# Patient Record
Sex: FEMALE | Race: WHITE | NOT HISPANIC OR LATINO | URBAN - METROPOLITAN AREA
[De-identification: names, ages, dates, MRNs, and addresses within clinical notes are randomized per-mention and may not be internally consistent; named-entity substitution may affect disease eponyms.]

---

## 2023-04-06 DIAGNOSIS — R92.8 ABNORMAL MAMMOGRAM: ICD-10-CM

## 2023-04-21 ENCOUNTER — TELEPHONE (OUTPATIENT)
Dept: PRIMARY CARE | Facility: CLINIC | Age: 63
End: 2023-04-21

## 2023-05-24 ENCOUNTER — TELEPHONE (OUTPATIENT)
Dept: PRIMARY CARE | Facility: CLINIC | Age: 63
End: 2023-05-24

## 2023-05-24 NOTE — TELEPHONE ENCOUNTER
Sorry I didn't write it clear enough.  Dr. Saldana wants a call back clarifying if this is true and get confirmation on some items on things in patient chart.  Patient isn't asking our office for meds.  Please call back Dr. Saldana.

## 2023-05-24 NOTE — TELEPHONE ENCOUNTER
Patient requesting antibiotics profalaxis for dental work because she gets infections after dental work and has to go to hospital.  She stated it is also because of her RA.  Dr. Saldana is requesting a call back to get confirmation please call her back.

## 2023-06-30 LAB
ALANINE AMINOTRANSFERASE (SGPT) (U/L) IN SER/PLAS: 23 U/L (ref 7–45)
ALBUMIN (G/DL) IN SER/PLAS: 4.5 G/DL (ref 3.4–5)
ALKALINE PHOSPHATASE (U/L) IN SER/PLAS: 69 U/L (ref 33–136)
ANION GAP IN SER/PLAS: 13 MMOL/L (ref 10–20)
ASPARTATE AMINOTRANSFERASE (SGOT) (U/L) IN SER/PLAS: 19 U/L (ref 9–39)
BASOPHILS (10*3/UL) IN BLOOD BY AUTOMATED COUNT: 0.03 X10E9/L (ref 0–0.1)
BASOPHILS/100 LEUKOCYTES IN BLOOD BY AUTOMATED COUNT: 0.6 % (ref 0–2)
BILIRUBIN TOTAL (MG/DL) IN SER/PLAS: 0.3 MG/DL (ref 0–1.2)
CALCIUM (MG/DL) IN SER/PLAS: 9.4 MG/DL (ref 8.6–10.6)
CARBON DIOXIDE, TOTAL (MMOL/L) IN SER/PLAS: 26 MMOL/L (ref 21–32)
CHLORIDE (MMOL/L) IN SER/PLAS: 104 MMOL/L (ref 98–107)
CREATININE (MG/DL) IN SER/PLAS: 0.75 MG/DL (ref 0.5–1.05)
EOSINOPHILS (10*3/UL) IN BLOOD BY AUTOMATED COUNT: 0.08 X10E9/L (ref 0–0.7)
EOSINOPHILS/100 LEUKOCYTES IN BLOOD BY AUTOMATED COUNT: 1.6 % (ref 0–6)
ERYTHROCYTE DISTRIBUTION WIDTH (RATIO) BY AUTOMATED COUNT: 12.9 % (ref 11.5–14.5)
ERYTHROCYTE MEAN CORPUSCULAR HEMOGLOBIN CONCENTRATION (G/DL) BY AUTOMATED: 32.1 G/DL (ref 32–36)
ERYTHROCYTE MEAN CORPUSCULAR VOLUME (FL) BY AUTOMATED COUNT: 97 FL (ref 80–100)
ERYTHROCYTES (10*6/UL) IN BLOOD BY AUTOMATED COUNT: 4.13 X10E12/L (ref 4–5.2)
GFR FEMALE: >90 ML/MIN/1.73M2
GLUCOSE (MG/DL) IN SER/PLAS: 85 MG/DL (ref 74–99)
HEMATOCRIT (%) IN BLOOD BY AUTOMATED COUNT: 40.2 % (ref 36–46)
HEMOGLOBIN (G/DL) IN BLOOD: 12.9 G/DL (ref 12–16)
IMMATURE GRANULOCYTES/100 LEUKOCYTES IN BLOOD BY AUTOMATED COUNT: 0.8 % (ref 0–0.9)
LEUKOCYTES (10*3/UL) IN BLOOD BY AUTOMATED COUNT: 5.1 X10E9/L (ref 4.4–11.3)
LYMPHOCYTES (10*3/UL) IN BLOOD BY AUTOMATED COUNT: 1.8 X10E9/L (ref 1.2–4.8)
LYMPHOCYTES/100 LEUKOCYTES IN BLOOD BY AUTOMATED COUNT: 35.2 % (ref 13–44)
MONOCYTES (10*3/UL) IN BLOOD BY AUTOMATED COUNT: 0.39 X10E9/L (ref 0.1–1)
MONOCYTES/100 LEUKOCYTES IN BLOOD BY AUTOMATED COUNT: 7.6 % (ref 2–10)
NEUTROPHILS (10*3/UL) IN BLOOD BY AUTOMATED COUNT: 2.77 X10E9/L (ref 1.2–7.7)
NEUTROPHILS/100 LEUKOCYTES IN BLOOD BY AUTOMATED COUNT: 54.2 % (ref 40–80)
NRBC (PER 100 WBCS) BY AUTOMATED COUNT: 0 /100 WBC (ref 0–0)
PLATELETS (10*3/UL) IN BLOOD AUTOMATED COUNT: 246 X10E9/L (ref 150–450)
POTASSIUM (MMOL/L) IN SER/PLAS: 4.3 MMOL/L (ref 3.5–5.3)
PROTEIN TOTAL: 7 G/DL (ref 6.4–8.2)
SODIUM (MMOL/L) IN SER/PLAS: 139 MMOL/L (ref 136–145)
UREA NITROGEN (MG/DL) IN SER/PLAS: 17 MG/DL (ref 6–23)

## 2024-01-29 DIAGNOSIS — M06.9 RHEUMATOID ARTHRITIS INVOLVING MULTIPLE SITES, UNSPECIFIED WHETHER RHEUMATOID FACTOR PRESENT (MULTI): Primary | ICD-10-CM

## 2024-01-29 RX ORDER — UPADACITINIB 15 MG/1
1 TABLET, EXTENDED RELEASE ORAL DAILY
COMMUNITY
Start: 2022-07-15 | End: 2024-01-29 | Stop reason: SDUPTHER

## 2024-01-29 RX ORDER — UPADACITINIB 15 MG/1
15 TABLET, EXTENDED RELEASE ORAL DAILY
Qty: 90 TABLET | Refills: 1 | Status: SHIPPED | OUTPATIENT
Start: 2024-01-29

## 2024-02-06 PROBLEM — M05.9 SEROPOSITIVE RHEUMATOID ARTHRITIS (MULTI): Status: ACTIVE | Noted: 2024-02-06

## 2024-02-06 PROBLEM — R76.8 CYCLIC CITRULLINATED PEPTIDE (CCP) ANTIBODY POSITIVE: Chronic | Status: ACTIVE | Noted: 2019-05-14

## 2024-02-06 NOTE — PROGRESS NOTES
Subjective   Patient ID: Devora Lawson is a 63 y.o. female who presents for No chief complaint on file..    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)    On Rinvoq   Last lab June 2023    Labs  cbc and cmp normal     Doing very well no pain joint swelling or stiffness of any degree  No side effects    PAIN: no  SWELLING: no  AM GEL: brief  SIDE EFFECTS OF MED: no     LAST LAB  Lab Results   Component Value Date     (H) 04/18/2022    SEDRATE 29 04/18/2022    CRP <0.10 01/20/2023    TSH 2.87 04/18/2022   Last lab November 2023 by [facility] normal CBC and CMP    PHYSICAL EXAM  NODES all stations negative 2+ equal  HEART  LUNGS  ABDOMEN soft without hepatosplenomegaly  VASCULAR 2+ equal  NEURO normal proximal distal muscle strength  SKIN no rash   JOINTS no active synovitis of any small or large joint of the upper or lower extremity  There is currently no information documented on the homunculus. Go to the Rheumatology activity and complete the homunculus joint exam.   Assessment/Plan   Diagnoses and all orders for this visit:  Cyclic citrullinated peptide (CCP) antibody positive  Seropositive rheumatoid arthritis (CMS/HCC)    Excellent response and suppression of rheumatoid arthritis utilizing written rinvoq.    No evidence of any active synovitis no side effects  Lab normal from November 2023 from [facility] results will be scanned    See back in 4 months with lab.

## 2024-02-07 ENCOUNTER — OFFICE VISIT (OUTPATIENT)
Dept: RHEUMATOLOGY | Facility: CLINIC | Age: 64
End: 2024-02-07
Payer: COMMERCIAL

## 2024-02-07 VITALS — SYSTOLIC BLOOD PRESSURE: 120 MMHG | HEART RATE: 65 BPM | DIASTOLIC BLOOD PRESSURE: 79 MMHG

## 2024-02-07 DIAGNOSIS — R76.8 CYCLIC CITRULLINATED PEPTIDE (CCP) ANTIBODY POSITIVE: Primary | Chronic | ICD-10-CM

## 2024-02-07 DIAGNOSIS — M05.9 SEROPOSITIVE RHEUMATOID ARTHRITIS (MULTI): ICD-10-CM

## 2024-02-07 PROCEDURE — 1036F TOBACCO NON-USER: CPT | Performed by: INTERNAL MEDICINE

## 2024-02-07 PROCEDURE — 99214 OFFICE O/P EST MOD 30 MIN: CPT | Performed by: INTERNAL MEDICINE

## 2024-02-07 RX ORDER — ZINC SULFATE 50(220)MG
CAPSULE ORAL
COMMUNITY

## 2024-02-07 RX ORDER — NORTRIPTYLINE HYDROCHLORIDE 10 MG/1
10 CAPSULE ORAL NIGHTLY
COMMUNITY

## 2024-02-07 RX ORDER — CHOLECALCIFEROL (VITAMIN D3) 25 MCG
TABLET ORAL
COMMUNITY

## 2024-02-07 RX ORDER — MAGNESIUM OXIDE/MAG AA CHELATE 300 MG
CAPSULE ORAL
COMMUNITY

## 2024-05-02 ENCOUNTER — OFFICE VISIT (OUTPATIENT)
Dept: DERMATOLOGY | Facility: CLINIC | Age: 64
End: 2024-05-02
Payer: COMMERCIAL

## 2024-05-02 DIAGNOSIS — L71.9 ROSACEA: Primary | ICD-10-CM

## 2024-05-02 DIAGNOSIS — L92.2 GRANULOMA FACIALE: ICD-10-CM

## 2024-05-02 DIAGNOSIS — L82.0 SEBORRHEIC KERATOSIS, INFLAMED: ICD-10-CM

## 2024-05-02 PROCEDURE — 11404 EXC TR-EXT B9+MARG 3.1-4 CM: CPT | Performed by: SPECIALIST

## 2024-05-02 PROCEDURE — 88305 TISSUE EXAM BY PATHOLOGIST: CPT | Performed by: DERMATOLOGY

## 2024-05-02 PROCEDURE — 99203 OFFICE O/P NEW LOW 30 MIN: CPT | Performed by: SPECIALIST

## 2024-05-02 NOTE — PROGRESS NOTES
Subjective   HPI: Devora Lawson is a 63 y.o. female is here for evaluation and treatment of spots on my body and arms and legs.  I am taking written David..     ROS: No other skin or systemic complaints other than what is documented elsewhere in the note.    ALLERGIES: Plaquenil [hydroxychloroquine]    SOCIAL:  reports that she has never smoked. She has never used smokeless tobacco. She reports current alcohol use. No history on file for drug use.    Objective     Description: Patient has erythema involving cheeks with 1 granulomatous circular area involving the left cheek.  Also noted are signs of seborrheic dermatitis involving her glabella and eyebrows.  The lesions involving her arms and legs are consistent with seborrheic keratoses.  These can be removed at a later date.  Continue giving 1    Assessment/Plan   1. Rosacea  Head - Anterior (Face)    Related Medications  ketoconazole-iodoquinoL-hc 2-1-2.5 % cream  Apply a thin layer to the affect area in the morning and at night    2. Granuloma faciale  Left Malar Cheek    Skin biopsy - Left Malar Cheek    Specimen 1 - Dermatopathology- DERM LAB  Differential Diagnosis: Granuloma faciale   Check Margins Yes/No?:    Comments:    Dermpath Lab: Routine Histopathology (formalin-fixed tissue)       Plan: SK N/V pharmaceuticals ketoconazole, iodoquinol hydrocortisone mix twice daily to face.  Shave biopsy for diagnosis only condylomatous oval area.  I discussed and reassured patient regarding her seborrheic keratoses and lentigo's.    FOLLOW UP: 1 week for biopsy results.    The patient was encouraged to contact me with any further questions or concerns.  Colin Berg MD  5/2/2024

## 2024-05-07 LAB
LABORATORY COMMENT REPORT: NORMAL
PATH REPORT.FINAL DX SPEC: NORMAL
PATH REPORT.GROSS SPEC: NORMAL
PATH REPORT.RELEVANT HX SPEC: NORMAL
PATH REPORT.TOTAL CANCER: NORMAL

## 2024-05-09 ENCOUNTER — APPOINTMENT (OUTPATIENT)
Dept: DERMATOLOGY | Facility: CLINIC | Age: 64
End: 2024-05-09
Payer: COMMERCIAL

## 2024-05-30 ENCOUNTER — OFFICE VISIT (OUTPATIENT)
Dept: DERMATOLOGY | Facility: CLINIC | Age: 64
End: 2024-05-30
Payer: COMMERCIAL

## 2024-05-30 DIAGNOSIS — L57.8 ACTINIC SKIN DAMAGE: ICD-10-CM

## 2024-05-30 PROCEDURE — 99213 OFFICE O/P EST LOW 20 MIN: CPT

## 2024-06-06 NOTE — PROGRESS NOTES
Subjective   Patient ID: Devora Lawsno is a 63 y.o. female who presents for No chief complaint on file..    HPI last seen 4 mos ago    TODAY VIRTUAL VIDEO APPT     GOOD TODAY  R ARM AND HAND NUMB   DR. 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)    On Rinvoq   Last lab June 2023    Labs  cbc and cmp normal     Doing very well no pain joint swelling or stiffness of any degree  No side effects    PAIN: no  SWELLING: no  AM GEL: brief  SIDE EFFECTS OF MED: no     LAST LAB  Lab Results   Component Value Date     (H) 04/18/2022    SEDRATE 29 04/18/2022    CRP <0.10 01/20/2023    TSH 2.87 04/18/2022   Last lab November 2023 by [facility] normal CBC and CMP    PHYSICAL EXAM  NODES all stations negative 2+ equal  HEART  LUNGS  ABDOMEN soft without hepatosplenomegaly  VASCULAR 2+ equal  NEURO normal proximal distal muscle strength  SKIN no rash   JOINTS no active synovitis of any small or large joint of the upper or lower extremity  There is currently no information documented on the homunculus. Go to the Rheumatology activity and complete the homunculus joint exam.   Assessment/Plan   There are no diagnoses linked to this encounter.    Excellent response and suppression of rheumatoid arthritis utilizing Rinvoq.    No evidence of any active synovitis no side effects  Lab THIS MO WILL MAIL HER REC TO GET AT [facility]     See back in 4 months with lab.

## 2024-06-06 NOTE — PROGRESS NOTES
Subjective   HPI: Devora Lawson is a 63 y.o. female who presents in office for biopsy results from 5/2/2024 Derm lab [id number] Left Malar Cheek.  Patient has questions about how to make this area heal better.    ROS: No other skin or systemic complaints other than what is documented elsewhere in the note.    ALLERGIES: Plaquenil [hydroxychloroquine]    SOCIAL:  reports that she has never smoked. She has never used smokeless tobacco. She reports current alcohol use. No history on file for drug use.    Objective   Head - Anterior (Face)  The area has healed nicely        Assessment/Plan   1. Actinic skin damage  Head - Anterior (Face)    Discussed biopsy results from 5/2/2024 Derm lab [id number] which showed nonspecific findings of actinic damage.  I discussed this with patient.  I also discussed that to better heal this area I recommended scar creams that contain silicone.  I recommended bio corneum applied twice daily as well as high SPF sunscreen SPF 50+ daily.         FOLLOW UP: As needed    The patient was encouraged to contact me with any further questions or concerns.  Doris Stewart PA-C  6/6/2024

## 2024-06-07 ENCOUNTER — TELEMEDICINE (OUTPATIENT)
Dept: RHEUMATOLOGY | Facility: CLINIC | Age: 64
End: 2024-06-07
Payer: COMMERCIAL

## 2024-06-07 DIAGNOSIS — M05.9 SEROPOSITIVE RHEUMATOID ARTHRITIS (MULTI): Primary | ICD-10-CM

## 2024-06-07 PROCEDURE — 99214 OFFICE O/P EST MOD 30 MIN: CPT | Performed by: INTERNAL MEDICINE

## 2024-06-07 NOTE — PROGRESS NOTES
Subjective   Patient ID: Devora Lawson is a 63 y.o. female who presents for No chief complaint on file..  [facility]    Review of Systems    Objective   Physical Exam    Assessment/Plan            Lorrie Burgos MA 06/07/24 1:57 PM Patient ID: Devora Lawson is a 63 y.o. female.    Procedures

## 2024-09-19 NOTE — PROGRESS NOTES
Patient presents today for new problem.  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  Offered MP joint arthrodesis.  She has not scheduled the surgery because she feels that her thumb is not painful and does not prevent daily activities.  Rheumatoid arthritis managed by Dr. Yip on [facility].  Presents today for a new problem.  Over the last year she has noticed a slowly progressively enlarging painless soft tissue mass over the volar ulnar aspect of her right forearm.  It is not painful except it is bothersome when she rests her forearm on a surface while keyboarding.    Past medical history, medications, allergies, surgical history and review of systems have been reviewed with the patient. Pertinent changes are documented in the HPI. Otherwise they are unchanged when compared to last visit on August 3, 2023.    Physical Examination Findings:  Constitutional: Appears well-developed and well-nourished.  Head: Normocephalic and atraumatic.  Eyes: Pupils are equal and round.  Cardiovascular: Intact distal pulses.   Respiratory: Effort normal. No respiratory distress.  Neurologic: Alert and oriented to person, place, and time.  Skin: Skin is warm and dry.  Hematologic / Lymphatic: No lymphedema, lymphangitis.  Psychiatric: normal mood and affect. Behavior is normal.   Musculoskeletal: Right upper extremity examination reveals a palpable and visible soft tissue mass measuring about 2 to 2-1/2 cm in diameter at the mid forearm on the volar ulnar aspect.  It is nontender and appears to be within the subcutaneous tissue plane.  No associated skin abnormalities.  Normal neurovascular sensory examination.  Mild flexion deformity right thumb MP joint.  No significant joint swelling.  Minimal tenderness to palpation at the MP joint.    X-rays of the right wrist extended up the forearm taken today demonstrate normal skeletal findings.    Impression: Right forearm  soft tissue mass.    Plan: Given its location and the physical examination findings this is most likely a lipoma or other benign condition.  Because it has increased in size over the last year patient has interest in removal.  We will get her scheduled for excisional biopsy of the soft tissue mass later this year.  She will discuss management of her rheumatoid medications with her rheumatologist at next visit.    We discussed risks, benefits, alternatives and anticipated post op course including time away from work and activities following surgical treatment for the patient's condition. This is major surgery with identifiable risks. No guarantees for success have been provided. The patient has expressed understanding and has elected to pursue operative treatment.        For Surgical Planning:  Diagnosis: Right forearm soft tissue mass  Procedure: Excision mass right forearm  CPT: 15527  Anesthesia: Local only  Duration: 30 minutes  Special Equipment Needed: None  Medical Notes / PM / DM / PAT needed?:  N/A  Location: [place] or [facility]  Initial Post Operative Visit: 2 weeks    Tha Navarro MD    [employer] [facility] [employer]  Department of Orthopaedic Surgery  Chief of Hand and Upper Extremity Surgery  [facility]    Dictation performed with the use of voice recognition software. Syntax and grammatical errors may exist.

## 2024-09-24 ENCOUNTER — OFFICE VISIT (OUTPATIENT)
Dept: ORTHOPEDIC SURGERY | Facility: HOSPITAL | Age: 64
End: 2024-09-24
Payer: COMMERCIAL

## 2024-09-24 ENCOUNTER — HOSPITAL ENCOUNTER (OUTPATIENT)
Dept: RADIOLOGY | Facility: HOSPITAL | Age: 64
Discharge: HOME | End: 2024-09-24
Payer: COMMERCIAL

## 2024-09-24 VITALS — WEIGHT: 158 LBS | BODY MASS INDEX: 25.39 KG/M2 | HEIGHT: 66 IN

## 2024-09-24 DIAGNOSIS — M05.9 SEROPOSITIVE RHEUMATOID ARTHRITIS: ICD-10-CM

## 2024-09-24 DIAGNOSIS — M79.89 MASS OF SOFT TISSUE OF FOREARM: Primary | ICD-10-CM

## 2024-09-24 DIAGNOSIS — M05.9 SEROPOSITIVE RHEUMATOID ARTHRITIS: Primary | ICD-10-CM

## 2024-09-24 PROCEDURE — 1036F TOBACCO NON-USER: CPT | Performed by: ORTHOPAEDIC SURGERY

## 2024-09-24 PROCEDURE — 73110 X-RAY EXAM OF WRIST: CPT | Mod: RT

## 2024-09-24 PROCEDURE — 99214 OFFICE O/P EST MOD 30 MIN: CPT | Performed by: ORTHOPAEDIC SURGERY

## 2024-09-24 PROCEDURE — 3008F BODY MASS INDEX DOCD: CPT | Performed by: ORTHOPAEDIC SURGERY

## 2024-09-24 PROCEDURE — 73110 X-RAY EXAM OF WRIST: CPT | Mod: RIGHT SIDE | Performed by: RADIOLOGY

## 2024-09-24 ASSESSMENT — PAIN - FUNCTIONAL ASSESSMENT: PAIN_FUNCTIONAL_ASSESSMENT: 0-10

## 2024-09-24 ASSESSMENT — PAIN DESCRIPTION - DESCRIPTORS: DESCRIPTORS: ACHING;SORE

## 2024-09-24 ASSESSMENT — PAIN SCALES - GENERAL: PAINLEVEL_OUTOF10: 5 - MODERATE PAIN

## 2024-09-24 NOTE — LETTER
September 24, 2024     Dana Andrade MD  [address] [address]    Patient: Devora Lawson   YOB: 1960   Date of Visit: 9/24/2024       Dear Dr. Dana Andrade MD:    Thank you for referring Devora Lawson to me for evaluation. Below are my notes for this consultation.  If you have questions, please do not hesitate to call me. I look forward to following your patient along with you.       Sincerely,     Tha Navarro MD      CC: Malcolm Yip, DO  ______________________________________________________________________________________    Patient presents today for new problem.  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  Offered MP joint arthrodesis.  She has not scheduled the surgery because she feels that her thumb is not painful and does not prevent daily activities.  [facility]  Presents today for a new problem.  Over the last year she has noticed a slowly progressively enlarging painless soft tissue mass over the volar ulnar aspect of her right forearm.  It is not painful except it is bothersome when she rests her forearm on a surface while keyboarding.    Past medical history, medications, allergies, surgical history and review of systems have been reviewed with the patient. Pertinent changes are documented in the HPI. Otherwise they are unchanged when compared to last visit on August 3, 2023.    Physical Examination Findings:  Constitutional: Appears well-developed and well-nourished.  Head: Normocephalic and atraumatic.  Eyes: Pupils are equal and round.  Cardiovascular: Intact distal pulses.   Respiratory: Effort normal. No respiratory distress.  Neurologic: Alert and oriented to person, place, and time.  Skin: Skin is warm and dry.  Hematologic / Lymphatic: No lymphedema, lymphangitis.  Psychiatric: normal mood and affect. Behavior is normal.    Musculoskeletal: Right upper extremity examination reveals a palpable and visible soft tissue mass measuring about 2 to 2-1/2 cm in diameter at the mid forearm on the volar ulnar aspect.  It is nontender and appears to be within the subcutaneous tissue plane.  No associated skin abnormalities.  Normal neurovascular sensory examination.  Mild flexion deformity right thumb MP joint.  No significant joint swelling.  Minimal tenderness to palpation at the MP joint.    X-rays of the right wrist extended up the forearm taken today demonstrate normal skeletal findings.    Impression: Right forearm soft tissue mass.    Plan: Given its location and the physical examination findings this is most likely a lipoma or other benign condition.  Because it has increased in size over the last year patient has interest in removal.  We will get her scheduled for excisional biopsy of the soft tissue mass later this year.  She will discuss management of her rheumatoid medications with her rheumatologist at next visit.    We discussed risks, benefits, alternatives and anticipated post op course including time away from work and activities following surgical treatment for the patient's condition. This is major surgery with identifiable risks. No guarantees for success have been provided. The patient has expressed understanding and has elected to pursue operative treatment.        For Surgical Planning:  Diagnosis: Right forearm soft tissue mass  Procedure: Excision mass right forearm  CPT: 71147  Anesthesia: Local only  Duration: 30 minutes  Special Equipment Needed: None  Medical Notes / PM / DM / PAT needed?:  N/A  Location: [place] or [facility]  Initial Post Operative Visit: 2 weeks    Tha Navarro MD    [employer] [employer]  Department of Orthopaedic Surgery  Chief of Hand and Upper Extremity Surgery  [facility]    Dictation performed with the  use of voice recognition software. Syntax and grammatical errors may exist.

## 2024-09-26 NOTE — PROGRESS NOTES
Subjective   Patient ID: Devora Lawson is a 63 y.o. female who presents for Follow-up (Patient states her RA is flaring and her shoulders are painful ).    HPI last seen June 2024    TODAY in person    Went to [place] for 3 weeks for shoulder pain  And FC elbows but can straighten  Rest body ok   Occ hand swelling    Labs lab tyson normal 8-2-24    GOOD TODAY  R ARM AND HAND NUMB   DR. 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)    On Rinvoq   Last lab June 2023    Labs  cbc and cmp normal     Doing very well no pain joint swelling or stiffness of any degree  No side effects    PAIN: y  SWELLING: no  AM GEL: brief  SIDE EFFECTS OF MED: no     LAST LAB  Lab Results   Component Value Date     (H) 04/18/2022    SEDRATE 29 04/18/2022    CRP <0.10 01/20/2023    TSH 2.87 04/18/2022   Last lab November 2024 by [facility] normal CBC and CMP    PHYSICAL EXAM  NODES all stations negative 2+ equal  HEART  LUNGS  ABDOMEN soft without hepatosplenomegaly  VASCULAR 2+ equal  NEURO normal proximal distal muscle strength  SKIN no rash   JOINTS no active synovitis of any small or large joint of the upper or lower extremity  There is currently no information documented on the homunculus. Go to the Rheumatology activity and complete the homunculus joint exam.   Assessment/Plan   Diagnoses and all orders for this visit:  Seropositive rheumatoid arthritis (Multi)  Cyclic citrullinated peptide (CCP) antibody positive  Rheumatoid arthritis involving multiple sites, unspecified whether rheumatoid factor present (Multi)  -     upadacitinib ER (Rinvoq) 15 mg tablet extended release 24 hr; Take 1 tablet (15 mg) by mouth once daily.    Sounds like a minor  flare of RA past 3 weeks  Exam no synovitis  She does not want prednisone  She will continue to use her Advil + with tylenol   Cont Rinvoq  Next lab include WSR and CRP         See back in 4 months with lab.

## 2024-09-27 ENCOUNTER — APPOINTMENT (OUTPATIENT)
Dept: RHEUMATOLOGY | Facility: CLINIC | Age: 64
End: 2024-09-27
Payer: COMMERCIAL

## 2024-09-27 VITALS — SYSTOLIC BLOOD PRESSURE: 127 MMHG | RESPIRATION RATE: 18 BRPM | HEART RATE: 58 BPM | DIASTOLIC BLOOD PRESSURE: 75 MMHG

## 2024-09-27 DIAGNOSIS — M06.9 RHEUMATOID ARTHRITIS INVOLVING MULTIPLE SITES, UNSPECIFIED WHETHER RHEUMATOID FACTOR PRESENT (MULTI): ICD-10-CM

## 2024-09-27 DIAGNOSIS — M05.9 SEROPOSITIVE RHEUMATOID ARTHRITIS: Primary | ICD-10-CM

## 2024-09-27 DIAGNOSIS — R76.8 CYCLIC CITRULLINATED PEPTIDE (CCP) ANTIBODY POSITIVE: Chronic | ICD-10-CM

## 2024-09-27 PROCEDURE — 99214 OFFICE O/P EST MOD 30 MIN: CPT | Performed by: INTERNAL MEDICINE

## 2024-10-31 ENCOUNTER — HOSPITAL ENCOUNTER (OUTPATIENT)
Dept: RADIOLOGY | Facility: CLINIC | Age: 64
Discharge: HOME | End: 2024-10-31
Payer: COMMERCIAL

## 2024-10-31 DIAGNOSIS — Z12.31 ENCOUNTER FOR SCREENING MAMMOGRAM FOR MALIGNANT NEOPLASM OF BREAST: ICD-10-CM

## 2024-10-31 PROCEDURE — 77067 SCR MAMMO BI INCL CAD: CPT

## 2024-11-06 DIAGNOSIS — M79.89 FOREARM SWELLING: ICD-10-CM

## 2024-11-15 ENCOUNTER — HOSPITAL ENCOUNTER (OUTPATIENT)
Facility: CLINIC | Age: 64
Setting detail: OUTPATIENT SURGERY
Discharge: HOME | End: 2024-11-15
Attending: ORTHOPAEDIC SURGERY | Admitting: ORTHOPAEDIC SURGERY
Payer: COMMERCIAL

## 2024-11-15 VITALS
RESPIRATION RATE: 16 BRPM | OXYGEN SATURATION: 99 % | DIASTOLIC BLOOD PRESSURE: 88 MMHG | HEART RATE: 69 BPM | SYSTOLIC BLOOD PRESSURE: 158 MMHG | WEIGHT: 161.38 LBS | BODY MASS INDEX: 25.94 KG/M2 | HEIGHT: 66 IN | TEMPERATURE: 97.7 F

## 2024-11-15 DIAGNOSIS — M79.89 FOREARM SWELLING: Primary | ICD-10-CM

## 2024-11-15 PROCEDURE — 7100000010 HC PHASE TWO TIME - EACH INCREMENTAL 1 MINUTE: Performed by: ORTHOPAEDIC SURGERY

## 2024-11-15 PROCEDURE — 2500000005 HC RX 250 GENERAL PHARMACY W/O HCPCS: Performed by: ORTHOPAEDIC SURGERY

## 2024-11-15 PROCEDURE — 2500000004 HC RX 250 GENERAL PHARMACY W/ HCPCS (ALT 636 FOR OP/ED): Performed by: ORTHOPAEDIC SURGERY

## 2024-11-15 PROCEDURE — 88304 TISSUE EXAM BY PATHOLOGIST: CPT | Mod: TC,SUR | Performed by: ORTHOPAEDIC SURGERY

## 2024-11-15 PROCEDURE — 3600000008 HC OR TIME - EACH INCREMENTAL 1 MINUTE - PROCEDURE LEVEL THREE: Performed by: ORTHOPAEDIC SURGERY

## 2024-11-15 PROCEDURE — 88304 TISSUE EXAM BY PATHOLOGIST: CPT | Performed by: STUDENT IN AN ORGANIZED HEALTH CARE EDUCATION/TRAINING PROGRAM

## 2024-11-15 PROCEDURE — 3600000003 HC OR TIME - INITIAL BASE CHARGE - PROCEDURE LEVEL THREE: Performed by: ORTHOPAEDIC SURGERY

## 2024-11-15 PROCEDURE — 25075 EXC FOREARM LES SC < 3 CM: CPT | Performed by: ORTHOPAEDIC SURGERY

## 2024-11-15 PROCEDURE — 7100000009 HC PHASE TWO TIME - INITIAL BASE CHARGE: Performed by: ORTHOPAEDIC SURGERY

## 2024-11-15 RX ORDER — HYDROCODONE BITARTRATE AND ACETAMINOPHEN 5; 325 MG/1; MG/1
1 TABLET ORAL EVERY 6 HOURS PRN
Qty: 20 TABLET | Refills: 0 | Status: SHIPPED | OUTPATIENT
Start: 2024-11-15 | End: 2024-11-20

## 2024-11-15 RX ORDER — SODIUM CHLORIDE 0.9 G/100ML
IRRIGANT IRRIGATION AS NEEDED
Status: DISCONTINUED | OUTPATIENT
Start: 2024-11-15 | End: 2024-11-15 | Stop reason: HOSPADM

## 2024-11-15 ASSESSMENT — PAIN SCALES - GENERAL
PAINLEVEL_OUTOF10: 0 - NO PAIN

## 2024-11-15 ASSESSMENT — COLUMBIA-SUICIDE SEVERITY RATING SCALE - C-SSRS
6. HAVE YOU EVER DONE ANYTHING, STARTED TO DO ANYTHING, OR PREPARED TO DO ANYTHING TO END YOUR LIFE?: NO
1. IN THE PAST MONTH, HAVE YOU WISHED YOU WERE DEAD OR WISHED YOU COULD GO TO SLEEP AND NOT WAKE UP?: NO
2. HAVE YOU ACTUALLY HAD ANY THOUGHTS OF KILLING YOURSELF?: NO

## 2024-11-15 ASSESSMENT — PAIN - FUNCTIONAL ASSESSMENT
PAIN_FUNCTIONAL_ASSESSMENT: 0-10

## 2024-11-15 NOTE — H&P
"History Of Present Illness  Devora Lawson is a 64 y.o. female presenting with symptomatic soft tissue mass volar aspect right distal forearm.     Past Medical History  Past Medical History:   Diagnosis Date    Encounter for gynecological examination (general) (routine) without abnormal findings     Pap test, as part of routine gynecological examination    Other conditions influencing health status     Mammogram normal    Personal history of other benign neoplasm 2014    History of uterine leiomyoma    Personal history of other specified conditions 2014    History of abnormal Pap smear       Surgical History  Past Surgical History:   Procedure Laterality Date     SECTION, CLASSIC  2014     Section    OTHER SURGICAL HISTORY  10/18/2021    Colonoscopy    OTHER SURGICAL HISTORY  2021    Breast augmentation        Social History  She reports that she has never smoked. She has never used smokeless tobacco. She reports current alcohol use. No history on file for drug use.    Family History  No family history on file.     Allergies  Plaquenil [hydroxychloroquine]    Review of Systems   All other systems reviewed and are negative.       Physical Exam  Physical Examination Findings:  Constitutional: Appears well-developed and well-nourished.  Head: Normocephalic and atraumatic.  Eyes: Pupils are equal and round.  Cardiovascular: Intact distal pulses.   Respiratory: Effort normal. No respiratory distress.  Neurologic: Alert and oriented to person, place, and time.  Skin: Skin is warm and dry.  Hematologic / Lympahtic: No lymphedema, lymphangitis.  Psychiatric: normal mood and affect. Behavior is normal.   Musculoskeletal: Right upper extremity with a nontender soft tissue mass volar ulnar aspect distal forearm      Last Recorded Vitals  Blood pressure 133/77, pulse 67, temperature 36.6 °C (97.9 °F), temperature source Temporal, resp. rate 16, height 1.676 m (5' 6\"), weight 73.2 kg " (161 lb 6 oz), SpO2 99%.       Assessment/Plan   Assessment & Plan  Forearm swelling      Will proceed with mass excision as scheduled       Tha Navarro MD

## 2024-11-15 NOTE — DISCHARGE INSTRUCTIONS
Post-Operative Instructions  Dr. Tha Navarro [phone]    Dressing:  You have a bandage or splint covering your operative site.    You may remove the dressing in 4  days following surgery. Once your dressing is removed you may shower and/or wash your incision in a sink with soap and water. Do not soak your incision. No bath tubs, hot tubs or swimming pools. After washing the wound please pat the incision dry thoroughly. Please use mild soap. Please do not use hydrogen peroxide. Please cover the wound with a band-aid or gauze and change it daily. Please do not apply any salves, creams, lotions or ointments to the surgical incision. Otherwise keep incision clean and dry (no yard work, engine work, etc.)    Post Anesthesia Instructions:  If you received general anesthesia or IV sedation for your procedure for the next 24 hours: No driving, no drinking alcohol, no sleeping aids, no important decision making, and have an adult with you for 24 hours.    Showering:  You may shower following surgery but please adhere to above instructions regarding the dressing. If showering with bandage/splint in place please ensure that it is kept dry and covered while bathing. There are commercially available cast bags that can be used to protect your dressing while showering. If using garbage bags please make sure that there are no holes in the bag and that the bag has been sealed above the dressing. If the bandage gets wet you must contact your surgeon's office to make arrangements to be seen to have the bandage changed.     Ice/Elevation:  The application of ice to your surgical site after surgery will help with pain control and swelling. This can be very effective for 48-72 hours after surgery. Please be careful to avoid getting bandage wet from a leaky ice bag. Please be advised that the ice may need to be applied for longer periods of time for the cooling effect to penetrate the post-operative dressing.     Elevation of the  operative site above the level of the heart as much as possible for the first 48-72 hours after surgery. Use your sling if you have been provided one while standing or walking. If your fingers are not included in the dressing you are encouraged to attempt finger range of motion as this will help with your hand swelling and ultimate recovery.    Pain Medication:  If you received a regional anesthetic on the day of your surgery your arm and hand may be numb for up to 24 hours after surgery. It is important to wear your sling while the block is still effective in order to protect your arm. It is advisable to take pain medications prior to going to sleep in case the regional anesthesia medication wears off while you are sleeping.    Take your pain medications as directed. Narcotic pain medications can cause lethargy, nausea and constipation. Please contact your surgeon's office and discontinue the medication if these symptoms become problematic. Eating a regular diet, drinking fluids and walking can help with constipation from these medications. Avoid alcohol consumption and driving while taking narcotic pain medications.     Additional pain control options:     You are encouraged to take over the counter medications like Advil / Motrin / Ibuprofen / Aleve in addition to your prescribed pain medications after surgery.    Smoking/Tobacco:  Tobacco use is known to interfere with wound and fracture healing and increase post-operative pain. It can also increase your risk of poor outcomes following surgery. Please do not use tobacco or nicotine products following your surgery. This includes smokeless tobacco, or nicotine replacement products (patches, gum, etc.).    Driving:  It is not advisable to operate a vehicle while using narcotic pain medications. Additionally, please be advised that you are likely to have challenges operating a car or motorcycle while you are still in your postoperative dressing and this could  increase your risk of being involved in an accident and being cited for driving while physically impaired.     Warning Signs:  Observe your arm/hand and incision site (if visible) for increased redness, inflammation, drainage, odor or pain that is unrelieved by rest, elevation or medication. Please contact your surgeon's office immediately if you develop any of these issues or if you develop a fever greater than 101°.    Follow Up Appointments:  Your post-operative appointment has been scheduled for 12/3/2024 at 10:15 am    [facility], [address], [place], [place], [address]

## 2024-11-15 NOTE — OP NOTE
Excision mass right forearm / 30 Minutes (R) Operative Note     Date: 11/15/2024  OR Location: [facility] OR    Name: Devora Lawson, : 1960, Age: 64 y.o., MRN: [id number], Sex: female    Diagnosis  Pre-op Diagnosis      * Forearm swelling [M79.89] Post-op Diagnosis     * Forearm swelling [M79.89]     Procedures  Excision mass right forearm / 30 Minutes  44933 - [place] EXC TUMOR SOFT TISSUE FOREARM &/WRIST SUBQ <3CM      Surgeons      * Tha Navarro - Primary    Resident/Fellow/Other Assistant:  Surgeons and Role:     * Chago Martin MD - Resident - Assisting    Staff:   Circulator: Jewels Hanub Person: Deepak Moore Person: Daina    Anesthesia Staff: No anesthesia staff entered.    Procedure Summary  Anesthesia: Anesthesia type not filed in the log.  ASA: ASA status not filed in the log.  Estimated Blood Loss: 0 mL  Intra-op Medications: Administrations occurring from 0945 to 1030 on 11/15/24:  * No intraprocedure medications in log *        Specimen:   ID Type Source Tests Collected by Time   1 : SOFT TISSUE MASS RIGHT FOREARM Tissue SOFT TISSUE MASS RESECTION SURGICAL PATHOLOGY EXAM Tha Navarro MD 11/15/2024 0936                 Drains and/or Catheters: * None in log *    Tourniquet Times:     Total Tourniquet Time Documented:  Arm - Upper (Right) - 5 minutes  Total: Arm - Upper (Right) - 5 minutes      Implants:     Findings: Subcutaneous soft tissue mass right forearm consistent clinically with lipoma    Indications: Devora Lawson is an 64 y.o. female who is having surgery for Forearm swelling [M79.89].      The patient was seen in the preoperative area. The risks, benefits, complications, treatment options, non-operative alternatives, expected recovery and outcomes were discussed with the patient. The possibilities of reaction to medication, pulmonary aspiration, injury to surrounding structures, bleeding, recurrent infection, the need for additional procedures, failure to diagnose a  condition, and creating a complication requiring transfusion or operation were discussed with the patient. The patient concurred with the proposed plan, giving informed consent.  The site of surgery was properly noted/marked if necessary per policy. The patient has been actively warmed in preoperative area. Preoperative antibiotics are not indicated. Venous thrombosis prophylaxis are not indicated.    Procedure Details:   64-year-old right-hand-dominant female with a symptomatic soft tissue mass along the volar ulnar aspect of the right forearm presents today for excisional biopsy.  Preoperatively the right arm was identified and marked for surgery.  Informed consent process was completed.    Patient was brought to the operating and placed supine on the operating table.  A timeout procedure was performed to verify correct patient procedure and operative site.  Local anesthetic infiltrated over the palpable mass on the volar ulnar aspect of the right forearm.  Right upper extremity was then prepped and draped in usual sterile fashion.  Gravity examination was performed and tourniquet was inflated to 250 mmHg.    Made a longitudinal incision directly over this palpable mass.  We dissected through the dermal layers and as we entered the subcutaneous adipose tissue layer we identified a discrete soft tissue mass clinically consistent with a lipoma.  This was easily dissected away from the surrounding normal-appearing subcutaneous adipose tissue.  Deep fascia was left preserved.  The mass was completely excised en bloc and then placed into formalin and sent to pathology for tissue analysis.  The wound was irrigated and then closed interrupted fashion.  A sterile bandage was applied and the tourniquet was deflated.  The patient was transferred to the recovery in stable condition.    Postoperatively she will be discharged home once comfortable.  She can remove her bandage and postop day #3 or 4 and begin wound care with  light activities as instructed.  She will return to clinic in 2 weeks for wound check and review of her pathology report.        Complications:  None; patient tolerated the procedure well.    Disposition: PACU - hemodynamically stable.  Condition: stable                 Additional Details:      Attending Attestation: I was present and scrubbed for the entire procedure.    Tha Navarro  Phone Number: [phone]

## 2024-11-28 NOTE — PROGRESS NOTES
[facility]  Hand and Upper Extremity Service  Post Operative visit         Date of surgery: 11/15/24    Surgery(s) performed: Excision mass right forearm        Subjective report: First postoperative visit. Overall doing great and has no pain. She's back to all her normal daily activities.        Exam findings: Right forearm reveals well healed surgical incision volar ulnar mid-forearm. No tenderness to palpation. No appreciated swelling.        Review of pathology report reveals diagnosis of angiolipoma.        Impression: Angiolipoma right forearm      Plan: We discussed this diagnosis and it's benign and unlikely to return. She can use the hand for all activities as tolerated. I have no restrictions for her. She can return in the future if she has further issues or concerns.         Follow Up: As needed            Tha Navarro MD    [facility] [employer]  Department of Orthopaedic Surgery  Chief of Hand and Upper Extremity Surgery  [facility]    Scribe Attestation  By signing my name below, ICandy Scribe   attest that this documentation has been prepared under the direction and in the presence of Dr. Tha Navarro.      Dictation performed with the use of voice recognition software. Syntax and grammatical errors may exist.

## 2024-12-03 ENCOUNTER — OFFICE VISIT (OUTPATIENT)
Dept: ORTHOPEDIC SURGERY | Facility: HOSPITAL | Age: 64
End: 2024-12-03
Payer: COMMERCIAL

## 2024-12-03 VITALS — WEIGHT: 161 LBS | BODY MASS INDEX: 25.88 KG/M2 | HEIGHT: 66 IN

## 2024-12-03 DIAGNOSIS — M79.89 MASS OF SOFT TISSUE OF FOREARM: Primary | ICD-10-CM

## 2024-12-03 PROCEDURE — 99211 OFF/OP EST MAY X REQ PHY/QHP: CPT | Performed by: ORTHOPAEDIC SURGERY

## 2024-12-03 PROCEDURE — 3008F BODY MASS INDEX DOCD: CPT | Performed by: ORTHOPAEDIC SURGERY

## 2024-12-03 PROCEDURE — 1036F TOBACCO NON-USER: CPT | Performed by: ORTHOPAEDIC SURGERY

## 2024-12-03 ASSESSMENT — PAIN - FUNCTIONAL ASSESSMENT: PAIN_FUNCTIONAL_ASSESSMENT: NO/DENIES PAIN

## 2025-02-07 ENCOUNTER — SPECIALTY PHARMACY (OUTPATIENT)
Dept: PHARMACY | Facility: CLINIC | Age: 65
End: 2025-02-07

## 2025-02-13 DIAGNOSIS — M06.9 RHEUMATOID ARTHRITIS INVOLVING MULTIPLE SITES, UNSPECIFIED WHETHER RHEUMATOID FACTOR PRESENT (MULTI): ICD-10-CM

## 2025-02-13 NOTE — TELEPHONE ENCOUNTER
Prescription Request for Rinvoq        Has The Patient Been Identified By Name And Date Of Birth: yes    RX Requestor: patient    Date of Last Refill: 9/27/24    Date Of Last Office Visit: 9/27/24    Date Of Future Office Visit: 2/21/25

## 2025-02-21 ENCOUNTER — APPOINTMENT (OUTPATIENT)
Dept: RHEUMATOLOGY | Facility: CLINIC | Age: 65
End: 2025-02-21
Payer: COMMERCIAL

## 2025-02-21 VITALS
DIASTOLIC BLOOD PRESSURE: 61 MMHG | OXYGEN SATURATION: 96 % | HEART RATE: 78 BPM | WEIGHT: 161 LBS | SYSTOLIC BLOOD PRESSURE: 102 MMHG | BODY MASS INDEX: 25.99 KG/M2

## 2025-02-21 DIAGNOSIS — M05.9 SEROPOSITIVE RHEUMATOID ARTHRITIS: ICD-10-CM

## 2025-02-21 DIAGNOSIS — R76.8 CYCLIC CITRULLINATED PEPTIDE (CCP) ANTIBODY POSITIVE: Primary | Chronic | ICD-10-CM

## 2025-02-21 DIAGNOSIS — M19.041 PRIMARY OSTEOARTHRITIS OF BOTH HANDS: ICD-10-CM

## 2025-02-21 DIAGNOSIS — M06.9 RHEUMATOID ARTHRITIS INVOLVING MULTIPLE SITES, UNSPECIFIED WHETHER RHEUMATOID FACTOR PRESENT (MULTI): ICD-10-CM

## 2025-02-21 DIAGNOSIS — M19.042 PRIMARY OSTEOARTHRITIS OF BOTH HANDS: ICD-10-CM

## 2025-02-21 PROCEDURE — 99214 OFFICE O/P EST MOD 30 MIN: CPT | Performed by: INTERNAL MEDICINE

## 2025-02-21 NOTE — PROGRESS NOTES
Subjective   Patient ID: Devora Lawson is a 64 y.o. female who presents for No chief complaint on file..    HPI last seen  sept 27, 2024    TODAY in person    Went to [place] for 3 weeks for shoulder pain  And FC elbows but can straighten  Rest body ok   Occ hand swelling    Labs lab tyson normal 8-2-24    GOOD TODAY  R ARM AND HAND NUMB   DR. 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)    On Rinvoq   Last lab June 2023    Labs  cbc and cmp normal     Doing very well no pain joint swelling or stiffness of any degree  No side effects    PAIN: y  SWELLING: no  AM GEL: brief  SIDE EFFECTS OF MED: no     LAST LAB  Lab Results   Component Value Date     (H) 04/18/2022    SEDRATE 29 04/18/2022    CRP <0.10 01/20/2023    TSH 2.87 04/18/2022   Last lab November 2024 by [facility] normal CBC and CMP    PHYSICAL EXAM  NODES all stations negative 2+ equal  HEART  LUNGS  ABDOMEN soft without hepatosplenomegaly  VASCULAR 2+ equal  NEURO normal proximal distal muscle strength  SKIN no rash   JOINTS no active synovitis of any small or large joint of the upper or lower extremity  There is currently no information documented on the homunculus. Go to the Rheumatology activity and complete the homunculus joint exam.   Assessment/Plan   There are no diagnoses linked to this encounter.      Exam no synovitis  She does not want prednisone  She will continue to use her Advil + with tylenol   Cont Rinvoq doing well   Next lab include WSR and CRP         See back in 4 months with lab.

## 2025-04-25 LAB
ALBUMIN SERPL-MCNC: 5 G/DL (ref 3.6–5.1)
ALP SERPL-CCNC: 67 U/L (ref 37–153)
ALT SERPL-CCNC: 23 U/L (ref 6–29)
ANION GAP SERPL CALCULATED.4IONS-SCNC: 15 MMOL/L (CALC) (ref 7–17)
AST SERPL-CCNC: 22 U/L (ref 10–35)
BASOPHILS # BLD AUTO: 40 CELLS/UL (ref 0–200)
BASOPHILS NFR BLD AUTO: 0.7 %
BILIRUB SERPL-MCNC: 0.5 MG/DL (ref 0.2–1.2)
BUN SERPL-MCNC: 22 MG/DL (ref 7–25)
CALCIUM SERPL-MCNC: 9.5 MG/DL (ref 8.6–10.4)
CHLORIDE SERPL-SCNC: 104 MMOL/L (ref 98–110)
CO2 SERPL-SCNC: 19 MMOL/L (ref 20–32)
CREAT SERPL-MCNC: 0.83 MG/DL (ref 0.5–1.05)
EGFRCR SERPLBLD CKD-EPI 2021: 80 ML/MIN/1.73M2
EOSINOPHIL # BLD AUTO: 57 CELLS/UL (ref 15–500)
EOSINOPHIL NFR BLD AUTO: 1 %
ERYTHROCYTE [DISTWIDTH] IN BLOOD BY AUTOMATED COUNT: 12.7 % (ref 11–15)
GLUCOSE SERPL-MCNC: 88 MG/DL (ref 65–139)
HCT VFR BLD AUTO: 43.6 % (ref 35–45)
HGB BLD-MCNC: 14.3 G/DL (ref 11.7–15.5)
LYMPHOCYTES # BLD AUTO: 1590 CELLS/UL (ref 850–3900)
LYMPHOCYTES NFR BLD AUTO: 27.9 %
MCH RBC QN AUTO: 30.8 PG (ref 27–33)
MCHC RBC AUTO-ENTMCNC: 32.8 G/DL (ref 32–36)
MCV RBC AUTO: 93.8 FL (ref 80–100)
MONOCYTES # BLD AUTO: 405 CELLS/UL (ref 200–950)
MONOCYTES NFR BLD AUTO: 7.1 %
NEUTROPHILS # BLD AUTO: 3608 CELLS/UL (ref 1500–7800)
NEUTROPHILS NFR BLD AUTO: 63.3 %
PLATELET # BLD AUTO: 298 THOUSAND/UL (ref 140–400)
PMV BLD REES-ECKER: 11.6 FL (ref 7.5–12.5)
POTASSIUM SERPL-SCNC: 4.6 MMOL/L (ref 3.5–5.3)
PROT SERPL-MCNC: 7.5 G/DL (ref 6.1–8.1)
RBC # BLD AUTO: 4.65 MILLION/UL (ref 3.8–5.1)
SODIUM SERPL-SCNC: 138 MMOL/L (ref 135–146)
WBC # BLD AUTO: 5.7 THOUSAND/UL (ref 3.8–10.8)

## 2025-06-19 NOTE — PROGRESS NOTES
Subjective   Patient ID: Devora Lawson is a 64 y.o. female who presents for No chief complaint on file..    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 NARVAEZ SAYS NO CTS   CYST R FOREARM   MINIMAL JOINT SWELLING HAND  WAY BETTER THAN I USED TO BE   LAST LAB 2-2024 NORMAL IN MEDIA SECTION     On Rinvoq   Last lab June 2023    Labs 4-2025 cbc and cmp normal     Doing very well no pain joint swelling or stiffness of any degree  No side effects    PAIN: y  SWELLING: no  AM GEL: brief  SIDE EFFECTS OF MED: no     LAST LAB  Lab Results   Component Value Date     (H) 04/18/2022    SEDRATE 29 04/18/2022    CRP <0.10 01/20/2023    TSH 2.87 04/18/2022   Last lab November 2024 by [facility] normal CBC and CMP    PHYSICAL EXAM  NODES all stations negative 2+ equal  HEART  LUNGS  ABDOMEN soft without hepatosplenomegaly  VASCULAR 2+ equal  NEURO normal proximal distal muscle strength  SKIN no rash   JOINTS no active synovitis of any small or large joint of the upper or lower extremity  There is currently no information documented on the homunculus. Go to the Rheumatology activity and [facility].   Assessment/Plan   There are no diagnoses linked to this encounter.      Exam no synovitis  She does not want prednisone  She will continue to use her Advil + with tylenol   Cont Rinvoq doing well     see back in 4 months with lab.

## 2025-06-20 ENCOUNTER — APPOINTMENT (OUTPATIENT)
Dept: RHEUMATOLOGY | Facility: CLINIC | Age: 65
End: 2025-06-20
Payer: COMMERCIAL

## 2025-06-20 VITALS
BODY MASS INDEX: 26.16 KG/M2 | DIASTOLIC BLOOD PRESSURE: 71 MMHG | WEIGHT: 162.8 LBS | SYSTOLIC BLOOD PRESSURE: 120 MMHG | OXYGEN SATURATION: 98 % | HEIGHT: 66 IN | HEART RATE: 59 BPM

## 2025-06-20 DIAGNOSIS — R76.8 CYCLIC CITRULLINATED PEPTIDE (CCP) ANTIBODY POSITIVE: Chronic | ICD-10-CM

## 2025-06-20 DIAGNOSIS — M19.042 PRIMARY OSTEOARTHRITIS OF BOTH HANDS: ICD-10-CM

## 2025-06-20 DIAGNOSIS — M19.041 PRIMARY OSTEOARTHRITIS OF BOTH HANDS: ICD-10-CM

## 2025-06-20 DIAGNOSIS — M06.9 RHEUMATOID ARTHRITIS INVOLVING MULTIPLE SITES, UNSPECIFIED WHETHER RHEUMATOID FACTOR PRESENT (MULTI): ICD-10-CM

## 2025-06-20 DIAGNOSIS — M05.9 SEROPOSITIVE RHEUMATOID ARTHRITIS: Primary | ICD-10-CM

## 2025-06-20 PROCEDURE — 3008F BODY MASS INDEX DOCD: CPT | Performed by: INTERNAL MEDICINE

## 2025-06-20 PROCEDURE — 1036F TOBACCO NON-USER: CPT | Performed by: INTERNAL MEDICINE

## 2025-06-20 PROCEDURE — 99214 OFFICE O/P EST MOD 30 MIN: CPT | Performed by: INTERNAL MEDICINE

## 2025-06-30 PROBLEM — H93.8X1 SENSATION OF PLUGGED EAR ON RIGHT SIDE: Status: ACTIVE | Noted: 2025-06-30

## 2025-06-30 PROBLEM — R20.0 NUMBNESS OF FACE: Status: ACTIVE | Noted: 2025-06-30

## 2025-06-30 PROBLEM — N39.0 URINARY TRACT INFECTION: Status: ACTIVE | Noted: 2025-06-30

## 2025-06-30 PROBLEM — R22.0 FACIAL SWELLING: Status: ACTIVE | Noted: 2025-06-30

## 2025-06-30 PROBLEM — H10.9 CONJUNCTIVITIS: Status: ACTIVE | Noted: 2025-06-30

## 2025-06-30 PROBLEM — F41.1 GAD (GENERALIZED ANXIETY DISORDER): Status: ACTIVE | Noted: 2017-08-04

## 2025-06-30 PROBLEM — S16.1XXA STRAIN OF NECK MUSCLE: Status: ACTIVE | Noted: 2025-06-30

## 2025-06-30 PROBLEM — K59.04 CHRONIC IDIOPATHIC CONSTIPATION: Status: ACTIVE | Noted: 2025-06-30

## 2025-06-30 PROBLEM — R20.2 PARESTHESIAS: Status: ACTIVE | Noted: 2025-06-30

## 2025-06-30 PROBLEM — E61.1 IRON DEFICIENCY: Status: ACTIVE | Noted: 2025-06-30

## 2025-06-30 PROBLEM — R73.01 IMPAIRED FASTING GLUCOSE: Status: ACTIVE | Noted: 2025-06-30

## 2025-06-30 PROBLEM — M99.00 CRANIAL SOMATIC DYSFUNCTION: Status: ACTIVE | Noted: 2025-06-30

## 2025-06-30 PROBLEM — Z79.624 ENCOUNTER FOR LONG TERM CURRENT AZATHIOPRINE THERAPY: Status: ACTIVE | Noted: 2025-06-30

## 2025-06-30 PROBLEM — M79.89 FOOT SWELLING: Status: ACTIVE | Noted: 2025-06-30

## 2025-06-30 PROBLEM — M06.9 RHEUMATOID ARTHRITIS: Status: ACTIVE | Noted: 2019-05-14

## 2025-06-30 PROBLEM — R53.83 FATIGUE: Status: ACTIVE | Noted: 2025-06-30

## 2025-06-30 PROBLEM — B49 INFECTION DUE TO FUNGUS: Status: ACTIVE | Noted: 2025-06-30

## 2025-06-30 PROBLEM — G51.8 NEURALGIC FACIAL PAIN: Status: ACTIVE | Noted: 2025-06-30

## 2025-06-30 PROBLEM — M77.40 METATARSALGIA: Status: ACTIVE | Noted: 2025-06-30

## 2025-06-30 PROBLEM — R29.898 DISORDER OF HAND: Status: ACTIVE | Noted: 2025-06-30

## 2025-06-30 PROBLEM — R14.0 ABDOMINAL BLOATING: Status: ACTIVE | Noted: 2025-06-30

## 2025-06-30 PROBLEM — F32.A DEPRESSION: Status: ACTIVE | Noted: 2017-08-04

## 2025-06-30 PROBLEM — K30 NON-ULCER DYSPEPSIA: Status: ACTIVE | Noted: 2025-06-30

## 2025-06-30 PROBLEM — M54.2 NECK PAIN: Status: ACTIVE | Noted: 2025-06-30

## 2025-06-30 PROBLEM — R10.9 ABDOMINAL PAIN: Status: ACTIVE | Noted: 2025-06-30

## 2025-06-30 PROBLEM — R51.9 FACIAL PAIN: Status: ACTIVE | Noted: 2025-06-30

## 2025-06-30 PROBLEM — R51.9 HEADACHE: Status: ACTIVE | Noted: 2025-06-30

## 2025-06-30 PROBLEM — R10.2 PELVIC PAIN IN FEMALE: Status: ACTIVE | Noted: 2025-06-30

## 2025-06-30 PROBLEM — H69.90 EUSTACHIAN TUBE DYSFUNCTION: Status: ACTIVE | Noted: 2025-06-30

## 2025-06-30 PROBLEM — M79.7 FIBROMYALGIA: Status: ACTIVE | Noted: 2017-08-04

## 2025-06-30 PROBLEM — R73.9 HYPERGLYCEMIA: Status: ACTIVE | Noted: 2025-06-30

## 2025-06-30 PROBLEM — J06.9 URI WITH COUGH AND CONGESTION: Status: ACTIVE | Noted: 2025-06-30

## 2025-06-30 PROBLEM — F41.9 ANXIETY: Status: ACTIVE | Noted: 2025-06-30

## 2025-06-30 PROBLEM — J01.00 MAXILLARY SINUSITIS, ACUTE: Status: ACTIVE | Noted: 2025-06-30

## 2025-06-30 PROBLEM — R32 URINARY INCONTINENCE: Status: ACTIVE | Noted: 2025-06-30

## 2025-07-02 ENCOUNTER — APPOINTMENT (OUTPATIENT)
Dept: PRIMARY CARE | Facility: CLINIC | Age: 65
End: 2025-07-02
Payer: COMMERCIAL

## 2025-07-02 VITALS
TEMPERATURE: 97.3 F | HEIGHT: 66 IN | SYSTOLIC BLOOD PRESSURE: 104 MMHG | OXYGEN SATURATION: 97 % | BODY MASS INDEX: 26.45 KG/M2 | WEIGHT: 164.6 LBS | DIASTOLIC BLOOD PRESSURE: 70 MMHG | HEART RATE: 61 BPM

## 2025-07-02 DIAGNOSIS — R06.83 SNORING: ICD-10-CM

## 2025-07-02 DIAGNOSIS — Z00.00 HEALTH MAINTENANCE EXAMINATION: ICD-10-CM

## 2025-07-02 DIAGNOSIS — E55.9 VITAMIN D DEFICIENCY: ICD-10-CM

## 2025-07-02 DIAGNOSIS — M05.9 RHEUMATOID ARTHRITIS WITH POSITIVE RHEUMATOID FACTOR, INVOLVING UNSPECIFIED SITE (MULTI): ICD-10-CM

## 2025-07-02 DIAGNOSIS — R73.01 IMPAIRED FASTING GLUCOSE: ICD-10-CM

## 2025-07-02 DIAGNOSIS — R63.5 WEIGHT GAIN: ICD-10-CM

## 2025-07-02 DIAGNOSIS — K21.9 GASTROESOPHAGEAL REFLUX DISEASE, UNSPECIFIED WHETHER ESOPHAGITIS PRESENT: Primary | ICD-10-CM

## 2025-07-02 DIAGNOSIS — E61.1 IRON DEFICIENCY: ICD-10-CM

## 2025-07-02 PROBLEM — K30 NON-ULCER DYSPEPSIA: Status: RESOLVED | Noted: 2025-06-30 | Resolved: 2025-07-02

## 2025-07-02 PROBLEM — R20.2 PARESTHESIAS: Status: RESOLVED | Noted: 2025-06-30 | Resolved: 2025-07-02

## 2025-07-02 PROBLEM — R10.9 ABDOMINAL PAIN: Status: RESOLVED | Noted: 2025-06-30 | Resolved: 2025-07-02

## 2025-07-02 PROBLEM — J06.9 URI WITH COUGH AND CONGESTION: Status: RESOLVED | Noted: 2025-06-30 | Resolved: 2025-07-02

## 2025-07-02 PROBLEM — H69.90 EUSTACHIAN TUBE DYSFUNCTION: Status: RESOLVED | Noted: 2025-06-30 | Resolved: 2025-07-02

## 2025-07-02 PROBLEM — R22.0 FACIAL SWELLING: Status: RESOLVED | Noted: 2025-06-30 | Resolved: 2025-07-02

## 2025-07-02 PROBLEM — R51.9 HEADACHE: Status: RESOLVED | Noted: 2025-06-30 | Resolved: 2025-07-02

## 2025-07-02 PROBLEM — R32 URINARY INCONTINENCE: Status: RESOLVED | Noted: 2025-06-30 | Resolved: 2025-07-02

## 2025-07-02 PROBLEM — M79.89 FOOT SWELLING: Status: RESOLVED | Noted: 2025-06-30 | Resolved: 2025-07-02

## 2025-07-02 PROBLEM — M77.40 METATARSALGIA: Status: RESOLVED | Noted: 2025-06-30 | Resolved: 2025-07-02

## 2025-07-02 PROBLEM — R53.83 FATIGUE: Status: RESOLVED | Noted: 2025-06-30 | Resolved: 2025-07-02

## 2025-07-02 PROBLEM — N39.0 URINARY TRACT INFECTION: Status: RESOLVED | Noted: 2025-06-30 | Resolved: 2025-07-02

## 2025-07-02 PROBLEM — M79.89 FOREARM SWELLING: Status: RESOLVED | Noted: 2024-11-06 | Resolved: 2025-07-02

## 2025-07-02 PROBLEM — B49 INFECTION DUE TO FUNGUS: Status: RESOLVED | Noted: 2025-06-30 | Resolved: 2025-07-02

## 2025-07-02 PROBLEM — H10.9 CONJUNCTIVITIS: Status: RESOLVED | Noted: 2025-06-30 | Resolved: 2025-07-02

## 2025-07-02 PROBLEM — R14.0 ABDOMINAL BLOATING: Status: RESOLVED | Noted: 2025-06-30 | Resolved: 2025-07-02

## 2025-07-02 PROBLEM — M54.2 NECK PAIN: Status: RESOLVED | Noted: 2025-06-30 | Resolved: 2025-07-02

## 2025-07-02 PROBLEM — R51.9 FACIAL PAIN: Status: RESOLVED | Noted: 2025-06-30 | Resolved: 2025-07-02

## 2025-07-02 PROBLEM — S16.1XXA STRAIN OF NECK MUSCLE: Status: RESOLVED | Noted: 2025-06-30 | Resolved: 2025-07-02

## 2025-07-02 PROBLEM — G51.8 NEURALGIC FACIAL PAIN: Status: RESOLVED | Noted: 2025-06-30 | Resolved: 2025-07-02

## 2025-07-02 PROBLEM — H93.8X1 SENSATION OF PLUGGED EAR ON RIGHT SIDE: Status: RESOLVED | Noted: 2025-06-30 | Resolved: 2025-07-02

## 2025-07-02 PROBLEM — R73.9 HYPERGLYCEMIA: Status: RESOLVED | Noted: 2025-06-30 | Resolved: 2025-07-02

## 2025-07-02 PROBLEM — R10.2 PELVIC PAIN IN FEMALE: Status: RESOLVED | Noted: 2025-06-30 | Resolved: 2025-07-02

## 2025-07-02 PROBLEM — J01.00 MAXILLARY SINUSITIS, ACUTE: Status: RESOLVED | Noted: 2025-06-30 | Resolved: 2025-07-02

## 2025-07-02 PROBLEM — R29.898 DISORDER OF HAND: Status: RESOLVED | Noted: 2025-06-30 | Resolved: 2025-07-02

## 2025-07-02 PROBLEM — R20.0 NUMBNESS OF FACE: Status: RESOLVED | Noted: 2025-06-30 | Resolved: 2025-07-02

## 2025-07-02 PROCEDURE — 99214 OFFICE O/P EST MOD 30 MIN: CPT | Performed by: FAMILY MEDICINE

## 2025-07-02 PROCEDURE — 3008F BODY MASS INDEX DOCD: CPT | Performed by: FAMILY MEDICINE

## 2025-07-02 PROCEDURE — 99396 PREV VISIT EST AGE 40-64: CPT | Performed by: FAMILY MEDICINE

## 2025-07-02 PROCEDURE — 1036F TOBACCO NON-USER: CPT | Performed by: FAMILY MEDICINE

## 2025-07-02 RX ORDER — PANTOPRAZOLE SODIUM 40 MG/1
40 TABLET, DELAYED RELEASE ORAL DAILY
Qty: 14 TABLET | Refills: 0 | Status: SHIPPED | OUTPATIENT
Start: 2025-07-02 | End: 2025-07-16

## 2025-07-02 ASSESSMENT — PATIENT HEALTH QUESTIONNAIRE - PHQ9
1. LITTLE INTEREST OR PLEASURE IN DOING THINGS: NOT AT ALL
2. FEELING DOWN, DEPRESSED OR HOPELESS: NOT AT ALL
SUM OF ALL RESPONSES TO PHQ9 QUESTIONS 1 AND 2: 0

## 2025-07-02 ASSESSMENT — ENCOUNTER SYMPTOMS
DEPRESSION: 0
OCCASIONAL FEELINGS OF UNSTEADINESS: 0
LOSS OF SENSATION IN FEET: 0

## 2025-07-02 NOTE — ASSESSMENT & PLAN NOTE
Recommended screening guidelines addressed and orders placed as indicated by age and chronic conditions  Screening labs ordered, will call with results  GYN for breast/pap/pelvic  Colonoscopy up to date  Continue to work on healthy lifestyle including well balanced diet, regular activity, limit alcohol, no tobacco products and safe sexual practices  Follow up annually

## 2025-07-02 NOTE — ASSESSMENT & PLAN NOTE
Discussed diet versus potential medication side effect  Start with prescription medication, protonix for 2 weeks  If improves then can do intermittent Pepcid OTC and elimination  Due to duration of symptoms will also refer to GI for further workup

## 2025-07-02 NOTE — PROGRESS NOTES
"Reason for Visit: Annual Physical Exam    HPI:  Presents to \[facility]"" care  Long time since she saw a PCP  Follows with Dr. Yip for RA, doing well with on Rinvoq compared to previous medication regimens    -Deals with acid reflux, feels burning come up and into her mouth.  States that there are certain triggers but not always obvious.  Had in the past and maybe had an EGD that was normal.  Occasional takes OTC meds but nothing routinely.  Functional medicine provider she saw in the past thought it was related to SIBO but never followed through with testing, does get abdominal pain as well.    -Deals with snoring that wakes her up and also daughter who lives with her notices it frequently.  Notices worse in last couple of years.    -Reports weight gain that has been difficult to manage in the last couple of years as well. Was on steroids in past but not in the last 2 years.    Active Problem List  Problem List[1]    Comprehensive Medical/Surgical/Social/Family History  Medical History[2]  Surgical History[3]  Social History[4]  Family History[5]      Allergies and Medications  Hydroxychloroquine, Fish containing products, Golimumab, Parsley, Pork extract, and Rice  Medications Ordered Prior to Encounter[6]      ROS otherwise negative aside from what was mentioned above in HPI.    Vitals  /70 (BP Location: Right arm, Patient Position: Sitting, BP Cuff Size: Adult)   Pulse 61   Temp 36.3 °C (97.3 °F) (Temporal)   Ht 1.676 m (5' 6\")   Wt 74.7 kg (164 lb 9.6 oz)   SpO2 97%   BMI 26.57 kg/m²   Body mass index is 26.57 kg/m².  Physical Exam  Gen: Alert, NAD  HEENT:  PERRL, EOMI, conjunctiva and sclera normal in appearance. External auditory canals/TMs normal; Oral cavity and posterior pharynx without lesions/exudate  Neck:  Supple with FROM; No masses/nodes palpable; Thyroid nontender and without nodules; No VIN  Respiratory:  Lungs CTAB  Cardiovascular:  Heart RRR. No M/R/G. Peripheral pulses equal " bilaterally  Abdomen:  Soft, nontender, No R/G/R; No HSM or masses palpated  Extremities:  FROM all extremities; Muscle strength grossly normal with good tone  Neuro:  CN II-XII intact; Gross motor and sensory intact  Skin:  No suspicious lesions present    Assessment and Plan:  Problem List Items Addressed This Visit       Rheumatoid arthritis    Overview   + joint pain and   swelling  after 2017 tetnus shot    May  2017  joints went  bad       she left a terrible  stressful  job   started  finalized 2014 father          Current Assessment & Plan   Continue follow up with rheumatology         Impaired fasting glucose    Relevant Orders    Hemoglobin A1C    Iron deficiency    Relevant Orders    Iron and TIBC    Vitamin B12    Health maintenance examination    Current Assessment & Plan   Recommended screening guidelines addressed and orders placed as indicated by age and chronic conditions  Screening labs ordered, will call with results  GYN for breast/pap/pelvic  Colonoscopy up to date  Continue to work on healthy lifestyle including well balanced diet, regular activity, limit alcohol, no tobacco products and safe sexual practices  Follow up annually           Relevant Orders    Lipid Panel    Basic Metabolic Panel    Gastroesophageal reflux disease - Primary    Current Assessment & Plan   Discussed diet versus potential medication side effect  Start with prescription medication, protonix for 2 weeks  If improves then can do intermittent Pepcid OTC and elimination  Due to duration of symptoms will also refer to GI for further workup         Relevant Medications    pantoprazole (ProtoNix) 40 mg EC tablet    Other Relevant Orders    Referral to Gastroenterology    Vitamin D 25-Hydroxy,Total (for eval of Vitamin D levels)    Iron and TIBC    Vitamin B12    Weight gain    Current Assessment & Plan   Could be hormonal versus hx of steroid use  Check labs  Continue healthy diet, work on  increasing activity as tolerated         Relevant Orders    TSH with reflex to Free T4 if abnormal    Hemoglobin A1C    Snoring    Current Assessment & Plan   With snoring, witnessed apnea will go ahead and obtain sleep study to evaluate  Other consideration is ENT versus GERD related, treat accordingly         Relevant Orders    Home sleep apnea test (HSAT)     Other Visit Diagnoses         Vitamin D deficiency        Relevant Orders    Vitamin D 25-Hydroxy,Total (for eval of Vitamin D levels)              Agata Car MD         [1]   Patient Active Problem List  Diagnosis    Rheumatoid arthritis    Cyclic citrullinated peptide (CCP) antibody positive    Osteoarthritis of both hands    Anxiety    Chronic idiopathic constipation    Cranial somatic dysfunction    Depression    Encounter for long term current azathioprine therapy    JOSUE (generalized anxiety disorder)    Ganglion of flexor tendon sheath of right thumb    Impaired fasting glucose    Iron deficiency    Fibromyalgia    Health maintenance examination    Gastroesophageal reflux disease    Weight gain    Snoring   [2]   Past Medical History:  Diagnosis Date    Anxiety     Arthritis     Encounter for gynecological examination (general) (routine) without abnormal findings     Pap test, as part of routine gynecological examination    GERD (gastroesophageal reflux disease)     Other conditions influencing health status     Mammogram normal    Personal history of other benign neoplasm 2014    History of uterine leiomyoma    Personal history of other specified conditions 2014    History of abnormal Pap smear    Rheumatoid arthritis    [3]   Past Surgical History:  Procedure Laterality Date    BREAST SURGERY  -breadt explanation     SECTION, CLASSIC  2014     Section     SECTION, LOW TRANSVERSE  ,    OTHER SURGICAL HISTORY  10/18/2021    Colonoscopy    OTHER SURGICAL HISTORY  2021    Breast augmentation    [4]   Social History  Tobacco Use    Smoking status: Never    Smokeless tobacco: Never   Substance Use Topics    Alcohol use: Yes     Alcohol/week: 2.0 standard drinks of alcohol     Types: 2 Glasses of wine per week     Comment: ocassionally    Drug use: Never   [5] No family history on file.  [6]   Current Outpatient Medications on File Prior to Visit   Medication Sig Dispense Refill    cholecalciferol (Vitamin D-3) 25 MCG (1000 UT) tablet Take by mouth.      upadacitinib ER (Rinvoq) 15 mg tablet extended release 24 hr Take 1 tablet (15 mg) by mouth once daily. 90 tablet 1    zinc sulfate (Zincate) 220 (50 Zn) MG capsule Take by mouth.       No current facility-administered medications on file prior to visit.

## 2025-07-02 NOTE — ASSESSMENT & PLAN NOTE
Could be hormonal versus hx of steroid use  Check labs  Continue healthy diet, work on increasing activity as tolerated

## 2025-07-02 NOTE — ASSESSMENT & PLAN NOTE
With snoring, witnessed apnea will go ahead and obtain sleep study to evaluate  Other consideration is ENT versus GERD related, treat accordingly

## 2025-07-04 LAB
25(OH)D3+25(OH)D2 SERPL-MCNC: 35 NG/ML (ref 30–100)
ANION GAP SERPL CALCULATED.4IONS-SCNC: 7 MMOL/L (CALC) (ref 7–17)
BUN SERPL-MCNC: 18 MG/DL (ref 7–25)
BUN/CREAT SERPL: NORMAL (CALC) (ref 6–22)
CALCIUM SERPL-MCNC: 8.9 MG/DL (ref 8.6–10.4)
CHLORIDE SERPL-SCNC: 104 MMOL/L (ref 98–110)
CHOLEST SERPL-MCNC: 234 MG/DL
CHOLEST/HDLC SERPL: 4.3 (CALC)
CO2 SERPL-SCNC: 26 MMOL/L (ref 20–32)
CREAT SERPL-MCNC: 0.87 MG/DL (ref 0.5–1.05)
EGFRCR SERPLBLD CKD-EPI 2021: 75 ML/MIN/1.73M2
EST. AVERAGE GLUCOSE BLD GHB EST-MCNC: 120 MG/DL
EST. AVERAGE GLUCOSE BLD GHB EST-SCNC: 6.6 MMOL/L
GLUCOSE SERPL-MCNC: 95 MG/DL (ref 65–99)
HBA1C MFR BLD: 5.8 %
HDLC SERPL-MCNC: 54 MG/DL
IRON SATN MFR SERPL: 24 % (CALC) (ref 16–45)
IRON SERPL-MCNC: 87 MCG/DL (ref 45–160)
LDLC SERPL CALC-MCNC: 159 MG/DL (CALC)
NONHDLC SERPL-MCNC: 180 MG/DL (CALC)
POTASSIUM SERPL-SCNC: 4.6 MMOL/L (ref 3.5–5.3)
SODIUM SERPL-SCNC: 137 MMOL/L (ref 135–146)
TIBC SERPL-MCNC: 370 MCG/DL (CALC) (ref 250–450)
TRIGL SERPL-MCNC: 97 MG/DL
TSH SERPL-ACNC: 1.78 MIU/L (ref 0.4–4.5)
VIT B12 SERPL-MCNC: 310 PG/ML (ref 200–1100)

## 2025-07-08 DIAGNOSIS — E78.00 ELEVATED LDL CHOLESTEROL LEVEL: Primary | ICD-10-CM

## 2025-10-09 ENCOUNTER — APPOINTMENT (OUTPATIENT)
Dept: GASTROENTEROLOGY | Facility: CLINIC | Age: 65
End: 2025-10-09
Payer: COMMERCIAL

## 2025-10-15 ENCOUNTER — APPOINTMENT (OUTPATIENT)
Dept: RHEUMATOLOGY | Facility: CLINIC | Age: 65
End: 2025-10-15
Payer: COMMERCIAL

## (undated) DEVICE — DRESSING, GAUZE, SPONGE, 12 PLY, 4 X 4 IN, PLASTIC POUCH, STRL 10PK

## (undated) DEVICE — GLOVE, SURGICAL, PROTEXIS PI BLUE W/NEUTHERA, 8.0, PF, LF

## (undated) DEVICE — GLOVE, SURGICAL, PROTEXIS PI , 7.5, PF, LF

## (undated) DEVICE — SYRINGE, 20 CC, LUER LOCK

## (undated) DEVICE — Device

## (undated) DEVICE — APPLICATOR, CHLORAPREP, W/ORANGE TINT, 26ML

## (undated) DEVICE — SUTURE, ETHILON, 4-0, BLK, MONO, PS-2 18

## (undated) DEVICE — CUFF, TOURNIQUET, 18 X 4, DUAL PORT/SNGL BLADDER, DISP, LF